# Patient Record
Sex: MALE | Race: WHITE | Employment: OTHER | URBAN - METROPOLITAN AREA
[De-identification: names, ages, dates, MRNs, and addresses within clinical notes are randomized per-mention and may not be internally consistent; named-entity substitution may affect disease eponyms.]

---

## 2022-09-22 ENCOUNTER — HOSPITAL ENCOUNTER (EMERGENCY)
Age: 22
Discharge: HOME | End: 2022-09-22
Attending: EMERGENCY MEDICINE
Payer: OTHER GOVERNMENT

## 2022-09-22 VITALS
HEIGHT: 72 IN | BODY MASS INDEX: 25.73 KG/M2 | OXYGEN SATURATION: 99 % | TEMPERATURE: 99.1 F | HEART RATE: 83 BPM | SYSTOLIC BLOOD PRESSURE: 121 MMHG | WEIGHT: 190 LBS | DIASTOLIC BLOOD PRESSURE: 78 MMHG | RESPIRATION RATE: 16 BRPM

## 2022-09-22 DIAGNOSIS — H10.211 CHEMICAL CONJUNCTIVITIS OF RIGHT EYE: Primary | ICD-10-CM

## 2022-09-22 PROCEDURE — 74011000250 HC RX REV CODE- 250: Performed by: PHYSICIAN ASSISTANT

## 2022-09-22 PROCEDURE — 99283 EMERGENCY DEPT VISIT LOW MDM: CPT

## 2022-09-22 RX ORDER — ERYTHROMYCIN 5 MG/G
OINTMENT OPHTHALMIC
Qty: 1 G | Refills: 0 | Status: SHIPPED | OUTPATIENT
Start: 2022-09-22 | End: 2022-09-29

## 2022-09-22 RX ORDER — PROPARACAINE HYDROCHLORIDE 5 MG/ML
1 SOLUTION/ DROPS OPHTHALMIC
Status: COMPLETED | OUTPATIENT
Start: 2022-09-22 | End: 2022-09-22

## 2022-09-22 RX ADMIN — FLUORESCEIN SODIUM 1 STRIP: 0.6 STRIP OPHTHALMIC at 12:23

## 2022-09-22 RX ADMIN — PROPARACAINE HYDROCHLORIDE 1 DROP: 5 SOLUTION/ DROPS OPHTHALMIC at 12:23

## 2022-09-22 NOTE — ED TRIAGE NOTES
Patient reports was working on truck an hydraulic fluid splashed in his right eye. Patient did irrigate eye as instructed.  Brought to ed via ambulance

## 2022-09-22 NOTE — ED PROVIDER NOTES
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 Associated sxs include right red eye and irritation to right eye. Pt ws working on truck when cold hydraulic fluid dripped into this right eye. Pt flushed eye from 5-10 minutes and sent to ED. Pt does not wear contacts. No hx of eye problems. Pt denies facial swelling, vision changes, and any other sxs or complaints. PCP: Jacinda        Past History     Past Medical History:  No past medical history on file. Past Surgical History:  Past Surgical History:   Procedure Laterality Date    HX APPENDECTOMY      HX HEENT         Family History:  No family history on file. Social History:  Social History     Tobacco Use    Smoking status: Never   Substance Use Topics    Alcohol use: Never    Drug use: Never       Allergies:  No Known Allergies      Review of Systems   Review of Systems   HENT:  Negative for facial swelling. Eyes:  Positive for pain and redness. Negative for discharge and visual disturbance. All other systems reviewed and are negative. Physical Exam     Vitals:    09/22/22 1209   BP: 121/78   Pulse: 83   Resp: 16   Temp: 99.1 °F (37.3 °C)   SpO2: 99%   Weight: 86.2 kg (190 lb)   Height: 6' (1.829 m)     Physical Exam  Vitals and nursing note reviewed. Constitutional:       General: He is not in acute distress. Appearance: He is well-developed. He is not diaphoretic. Comments:  male in NAD. Alert. Appears comfortable. In hallway bed   HENT:      Head: Normocephalic and atraumatic. No right periorbital erythema or left periorbital erythema. Jaw: No trismus.       Right Ear: External ear normal.      Left Ear: External ear normal.      Nose: Nose normal.   Eyes:      General: Lids are everted, no foreign bodies appreciated. No scleral icterus. Right eye: No foreign body, discharge or hordeolum. Left eye: No foreign body, discharge or hordeolum. Extraocular Movements: Extraocular movements intact. Conjunctiva/sclera:      Right eye: Right conjunctiva is injected. No chemosis or exudate. Left eye: Left conjunctiva is not injected. No chemosis or exudate. Pupils: Pupils are equal, round, and reactive to light. Comments: Trace injection to OD   Cardiovascular:      Rate and Rhythm: Normal rate and regular rhythm. Heart sounds: Normal heart sounds. No murmur heard. No friction rub. No gallop. Pulmonary:      Effort: Pulmonary effort is normal. No tachypnea, accessory muscle usage or respiratory distress. Breath sounds: Normal breath sounds. No decreased breath sounds, wheezing, rhonchi or rales. Musculoskeletal:         General: Normal range of motion. Cervical back: Normal range of motion. Lymphadenopathy:      Head:      Right side of head: No preauricular or posterior auricular adenopathy. Left side of head: No preauricular or posterior auricular adenopathy. Skin:     General: Skin is warm and dry. Neurological:      Mental Status: He is alert and oriented to person, place, and time. Psychiatric:         Judgment: Judgment normal.           Diagnostic Study Results     Labs -   No results found for this or any previous visit (from the past 12 hour(s)).     Radiologic Studies     No orders to display     CT Results  (Last 48 hours)      None          CXR Results  (Last 48 hours)      None            Medications given in the ED-  Medications   fluorescein (FLU-VINEET) 0.6 mg ophthalmic strip 1 Strip (1 Strip Both Eyes Given 9/22/22 1223)   proparacaine (OPTHAINE) 0.5 % ophthalmic solution 1 Drop (1 Drop Both Eyes Given 9/22/22 1223) Medical Decision Making   I am the first provider for this patient. I reviewed the vital signs, available nursing notes, past medical history, past surgical history, family history and social history. Vital Signs-Reviewed the patient's vital signs. Pulse Oximetry Analysis - 99% on RA. NORMAL     Records Reviewed: Nursing Notes    Provider Notes (Medical Decision Making): conjunctivitis, corneal ulcer, corneal abrasion, retained FB, keratitis, iritis    Procedures:  Eye Stain      Date/Time: 9/22/2022 1:44 PM    Performed by: PA  Supervising provider: Dr. Cherie Thomas        Corneal abrasion was not present on eyelid eversion. Cornea is clear. Anterior chamber is clear. Patient tolerance: patient tolerated the procedure well with no immediate complications  My total time at bedside, performing this procedure was 1-15 minutes. ED Course:   12:03 PM Initial assessment performed. The patients presenting problems have been discussed, and they are in agreement with the care plan formulated and outlined with them. I have encouraged them to ask questions as they arise throughout their visit. Diagnosis and Disposition       1:39 PM  Consult with Poison control. Agrees with eye irrigation. Ocular stain. Discussed excellent visual acuity of pt including 20/15. +/- ocular ABX. Observe for 2 hours. FU with optho. Discussed plan with pt. Feels better with proparacaine. FU with optho. Reasons to RTED discussed with pt. All questions answered. Pt feels comfortable going home at this time. Pt expressed understanding and he agrees with plan. 1. Chemical conjunctivitis of right eye        PLAN:  1. D/C Home  2. Discharge Medication List as of 9/22/2022  1:47 PM        3.    Follow-up Information       Follow up With Specialties Details Why [facility]    [phone]    Jonah Parada MD Ophthalmology  follow up with eye specialist. [facility] [place] [address] [address]  [phone]            _______________________________    Attestations: This note is prepared by Dustin Hernandez PA-C.  _______________________________        Please note that this dictation was completed with [employer], the computer voice recognition software. Quite often unanticipated grammatical, syntax, homophones, and other interpretive errors are inadvertently transcribed by the computer software. Please disregard these errors. Please excuse any errors that have escaped final proofreading.